# Patient Record
(demographics unavailable — no encounter records)

---

## 2017-06-28 NOTE — REP
Clinical: Pain with recent trauma .

 

Technique:  AP, lateral, bilateral oblique views of the left elbow.

 

Findings:

No definite acute fracture or dislocation is appreciated.  Osseous structures,

joint spaces and surrounding soft tissues appear normal for age.  Lateral view

demonstrates normal positioning to the anterior and posterior fat pads without

evidence for effusion/hemarthrosis.  No subcutaneous emphysema or foreign body

identified.

 

Impression:

No obvious acute fracture or dislocation.

If the patient remains symptomatic consider repeat evaluation in 3-5 days.

 

 

Signed by

Terell Wilde MD 06/28/2017 11:03 A